# Patient Record
Sex: MALE | Employment: FULL TIME | URBAN - METROPOLITAN AREA
[De-identification: names, ages, dates, MRNs, and addresses within clinical notes are randomized per-mention and may not be internally consistent; named-entity substitution may affect disease eponyms.]

---

## 2017-09-18 ENCOUNTER — OFFICE VISIT (OUTPATIENT)
Dept: OPTOMETRY | Facility: CLINIC | Age: 32
End: 2017-09-18
Payer: COMMERCIAL

## 2017-09-18 DIAGNOSIS — H52.203 MYOPIA OF BOTH EYES WITH ASTIGMATISM: Primary | ICD-10-CM

## 2017-09-18 DIAGNOSIS — H52.13 MYOPIA OF BOTH EYES WITH ASTIGMATISM: Primary | ICD-10-CM

## 2017-09-18 PROCEDURE — 92015 DETERMINE REFRACTIVE STATE: CPT | Performed by: OPTOMETRIST

## 2017-09-18 PROCEDURE — 92004 COMPRE OPH EXAM NEW PT 1/>: CPT | Performed by: OPTOMETRIST

## 2017-09-18 ASSESSMENT — VISUAL ACUITY
OD_CC: 20/20-1
OS_CC+: -1
OS_CC: 20/20
OD_CC+: -1
OS_SC: 20/200
METHOD: SNELLEN - LINEAR
OD_CC: 20/20
CORRECTION_TYPE: GLASSES
OD_SC: 20/150
OS_CC: 20/20

## 2017-09-18 ASSESSMENT — REFRACTION_MANIFEST
METHOD_AUTOREFRACTION: 1
OD_AXIS: 087
OS_CYLINDER: +1.25
OD_AXIS: 095
OS_AXIS: 076
OD_CYLINDER: +3.75
OS_CYLINDER: +2.50
OS_AXIS: 070
OD_SPHERE: -7.25
OD_SPHERE: -3.50
OS_SPHERE: -4.75
OS_SPHERE: -5.75
OD_CYLINDER: +0.75

## 2017-09-18 ASSESSMENT — SLIT LAMP EXAM - LIDS
COMMENTS: NORMAL
COMMENTS: NORMAL

## 2017-09-18 ASSESSMENT — REFRACTION_WEARINGRX
OS_AXIS: 080
OS_CYLINDER: +0.75
SPECS_TYPE: SVL
OD_SPHERE: -4.25
OD_CYLINDER: +0.50
OD_AXIS: 092
OS_SPHERE: -4.50

## 2017-09-18 ASSESSMENT — CUP TO DISC RATIO
OS_RATIO: 0.25
OD_RATIO: 0.25

## 2017-09-18 ASSESSMENT — EXTERNAL EXAM - LEFT EYE: OS_EXAM: NORMAL

## 2017-09-18 ASSESSMENT — TONOMETRY
IOP_METHOD: APPLANATION
OD_IOP_MMHG: 19
OS_IOP_MMHG: 19

## 2017-09-18 ASSESSMENT — EXTERNAL EXAM - RIGHT EYE: OD_EXAM: NORMAL

## 2017-09-18 NOTE — PATIENT INSTRUCTIONS
Mild Arcus  Slightly over minused R eye  , slight change in astigmatism  L eye  Good ocular health

## 2017-09-18 NOTE — PROGRESS NOTES
Chief Complaint   Patient presents with     COMPREHENSIVE EYE EXAM       slight change only .25 D at last visit  [facility]  Has worn contact lenses in the past prefers glasses     Last Eye Exam: 11/18/2015  Dilated Previously: Yes    What are you currently using to see?  glasses       Distance Vision Acuity: Satisfied with vision    Near Vision Acuity: Satisfied with vision while reading  with glasses    Eye Comfort: good  Do you use eye drops? 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       OBJECTIVE: See Ophthalmology exam    ASSESSMENT:    ICD-10-CM    1. Myopia of both eyes with astigmatism - Both Eyes H52.13 REFRACTION    H52.203 EYE EXAM (SIMPLE-NONBILLABLE)    over minused OD, slight astigmatism  Increase OS   mild arcus   PLAN:   Update prescription   Lipid panel  Return to clinic 2 y      Korina Rothman OD

## 2017-09-18 NOTE — MR AVS SNAPSHOT
After Visit Summary   9/18/2017    Moody Matson    MRN: [id number]           Patient Information     Date Of Birth          1985        Visit Information        Provider Department      9/18/2017 2:00 PM [facility]        Today's Diagnoses     Myopia of both eyes with astigmatism - Both Eyes    -  1      Care Instructions    Mild Arcus  Slightly over minused R eye  , slight change in astigmatism  L eye  Good ocular health          Follow-ups after your visit        Follow-up notes from your care team     Return in about 1 year (around 9/18/2018).      Who to contact     If you have questions or need follow up information about today's clinic visit or your schedule please contact [facility] directly at [phone].  Normal or non-critical lab and imaging results will be communicated to you by MyChart, letter or phone within 4 business days after the clinic has received the results. If you do not hear from us within 7 days, please contact the clinic through MyChart or phone. If you have a critical or abnormal lab result, we will notify you by phone as soon as possible.  Submit refill requests through [employer] or call your pharmacy and they will forward the refill request to us. Please allow 3 business days for your refill to be completed.          Additional Information About Your Visit        MyChart Information     [employer] gives you secure access to your electronic health record. If you see a primary care provider, you can also send messages to your care team and make appointments. If you have questions, please call your primary care clinic.  If you do not have a primary care provider, please call [phone] and they will assist you.        Care EveryWhere ID     This is your Care EveryWhere ID. This could be used by other organizations to access your [place] medical records  [id number]         Blood Pressure from Last 3 Encounters:    No data found for BP    Weight from Last 3 Encounters:   No data found for Wt              We Performed the Following     EYE EXAM (SIMPLE-NONBILLABLE)     REFRACTION        Primary Care Provider    None Specified       No primary provider on file.        Equal Access to Services     ANISH ADRIAN : Hadii aad ku hadsjkelvin Mayraed, isreal talonpat, paige jose, angie ganayo frankcharlene parekh kathie linda. So [facility] [phone].    ATENCIÓN: Si habla español, tiene a edwards disposición servicios gratuitos de asistencia lingüística. Llame al [phone].    We comply with applicable federal civil rights laws and [place] laws. We do not discriminate on the basis of race, color, national origin, age, disability sex, sexual orientation or gender identity.            Thank you!     Thank you for choosing [facility]  for your care. Our goal is always to provide you with excellent care. Hearing back from our patients is one way we can continue to improve our services. Please take a few minutes to complete the written survey that you may receive in the mail after your visit with us. Thank you!             Your Updated Medication List - Protect others around you: Learn how to safely use, store and throw away your medicines at www.disposemymeds.org.      Notice  As of 9/18/2017  3:12 PM    You have not been prescribed any medications.

## 2019-02-11 ENCOUNTER — OFFICE VISIT (OUTPATIENT)
Dept: OPTOMETRY | Facility: CLINIC | Age: 34
End: 2019-02-11
Payer: COMMERCIAL

## 2019-02-11 DIAGNOSIS — H52.13 MYOPIA OF BOTH EYES WITH ASTIGMATISM: Primary | ICD-10-CM

## 2019-02-11 DIAGNOSIS — H52.203 MYOPIA OF BOTH EYES WITH ASTIGMATISM: Primary | ICD-10-CM

## 2019-02-11 PROCEDURE — 92014 COMPRE OPH EXAM EST PT 1/>: CPT | Performed by: OPTOMETRIST

## 2019-02-11 PROCEDURE — 92015 DETERMINE REFRACTIVE STATE: CPT | Performed by: OPTOMETRIST

## 2019-02-11 ASSESSMENT — CONF VISUAL FIELD
OS_NORMAL: 1
METHOD: COUNTING FINGERS
OD_NORMAL: 1

## 2019-02-11 ASSESSMENT — REFRACTION_MANIFEST
OS_AXIS: 069
OS_AXIS: 065
OD_CYLINDER: +2.00
OD_AXIS: 080
OD_SPHERE: -5.50
OS_SPHERE: -5.25
OS_CYLINDER: +2.00
OD_SPHERE: -3.50
OS_SPHERE: -4.25
OD_AXIS: 079
OD_CYLINDER: +0.50
METHOD_AUTOREFRACTION: 1
OS_CYLINDER: +1.25

## 2019-02-11 ASSESSMENT — VISUAL ACUITY
CORRECTION_TYPE: GLASSES
OD_CC: 20/30
OS_SC: 20/400
OS_CC: 20/20
METHOD_MR: PRA 1.50
OD_SC: 20/400
OD_CC: 20/25
OS_CC: 20/20
METHOD: SNELLEN - LINEAR

## 2019-02-11 ASSESSMENT — REFRACTION_WEARINGRX
OS_SPHERE: -4.75
SPECS_TYPE: SVL
OD_CYLINDER: +0.75
OD_AXIS: 095
OS_AXIS: 070
OS_CYLINDER: +1.25
OD_SPHERE: -3.50

## 2019-02-11 ASSESSMENT — REFRACTION
OS_SPHERE: -4.25
OD_AXIS: 080
OD_SPHERE: -3.50
OS_AXIS: 065
OS_CYLINDER: +1.25
OD_CYLINDER: +0.50

## 2019-02-11 ASSESSMENT — TONOMETRY
OD_IOP_MMHG: 14
OS_IOP_MMHG: 14
IOP_METHOD: APPLANATION

## 2019-02-11 ASSESSMENT — CUP TO DISC RATIO
OS_RATIO: 0.25
OD_RATIO: 0.25

## 2019-02-11 ASSESSMENT — SLIT LAMP EXAM - LIDS
COMMENTS: NORMAL
COMMENTS: NORMAL

## 2019-02-11 ASSESSMENT — EXTERNAL EXAM - LEFT EYE: OS_EXAM: NORMAL

## 2019-02-11 ASSESSMENT — EXTERNAL EXAM - RIGHT EYE: OD_EXAM: NORMAL

## 2019-02-11 NOTE — PROGRESS NOTES
Chief Complaint   Patient presents with     Annual Eye Exam      Interested in lasik with Dr Ying at [facility]   Currently has a mild URI    Last Eye Exam: 2017 here with Dr. Rothman  Dilated Previously: Yes    What are you currently using to see?  glasses       Distance Vision Acuity: Satisfied with vision    Near Vision Acuity: Satisfied with vision while reading  with glasses    Eye Comfort: good  Do you use eye drops? : No  Occupation or Hobbies: Hospitalist    Debbie Varela, Optometric Assistant      Final Rx      Sphere Cylinder Axis   Right -3.50 +0.75 095   Left -4.75 +1.25 070   Type: SVL   Expiration Date: 9/19/2019         Medical, surgical and family histories reviewed and updated 2/11/2019.       OBJECTIVE: See Ophthalmology exam    ASSESSMENT:    ICD-10-CM    1. Myopia of both eyes with astigmatism H52.13     H52.203       PLAN:   Consult for lasik with Dr. Ying  Will fax records         Korina Rothman OD

## 2019-02-11 NOTE — LETTER
2/11/2019         RE: Moody Matson  [address] [address]        Dear Colleague,    Thank you for referring your patient, Moody Matson, to the [facility]. Please see a copy of my visit note below.    Chief Complaint   Patient presents with     Annual Eye Exam      Interested in lasik with Dr Ying at [facility]   Currently has a mild URI    Last Eye Exam: 2017 here with Dr. Rothman  Dilated Previously: Yes    What are you currently using to see?  glasses       Distance Vision Acuity: Satisfied with vision    Near Vision Acuity: Satisfied with vision while reading  with glasses    Eye Comfort: good  Do you use eye drops? : No  Occupation or Hobbies: Hospitalist    Debbie Varela, Optometric Assistant      Final Rx      Sphere Cylinder Axis   Right -3.50 +0.75 095   Left -4.75 +1.25 070   Type: SVL   Expiration Date: 9/19/2019         Medical, surgical and family histories reviewed and updated 2/11/2019.       OBJECTIVE: See Ophthalmology exam    ASSESSMENT:    ICD-10-CM    1. Myopia of both eyes with astigmatism H52.13     H52.203       PLAN:   Consult for lasik with Dr. Ying  Will fax records         Korina Rothman OD     Again, thank you for allowing me to participate in the care of your patient.        Sincerely,        Korina Rothman, OD

## 2020-03-02 ENCOUNTER — HEALTH MAINTENANCE LETTER (OUTPATIENT)
Age: 35
End: 2020-03-02

## 2020-08-18 ENCOUNTER — ANCILLARY PROCEDURE (OUTPATIENT)
Dept: GENERAL RADIOLOGY | Facility: CLINIC | Age: 35
End: 2020-08-18
Attending: PREVENTIVE MEDICINE
Payer: COMMERCIAL

## 2020-08-18 ENCOUNTER — OFFICE VISIT (OUTPATIENT)
Dept: URGENT CARE | Facility: URGENT CARE | Age: 35
End: 2020-08-18
Payer: COMMERCIAL

## 2020-08-18 VITALS
OXYGEN SATURATION: 98 % | WEIGHT: 287.5 LBS | HEART RATE: 92 BPM | SYSTOLIC BLOOD PRESSURE: 125 MMHG | TEMPERATURE: 97.3 F | DIASTOLIC BLOOD PRESSURE: 80 MMHG

## 2020-08-18 DIAGNOSIS — M25.532 LEFT WRIST PAIN: ICD-10-CM

## 2020-08-18 DIAGNOSIS — M25.532 LEFT WRIST PAIN: Primary | ICD-10-CM

## 2020-08-18 PROCEDURE — 73110 X-RAY EXAM OF WRIST: CPT | Mod: LT

## 2020-08-18 PROCEDURE — 90471 IMMUNIZATION ADMIN: CPT | Performed by: PREVENTIVE MEDICINE

## 2020-08-18 PROCEDURE — 99203 OFFICE O/P NEW LOW 30 MIN: CPT | Mod: 25 | Performed by: PREVENTIVE MEDICINE

## 2020-08-18 PROCEDURE — 73090 X-RAY EXAM OF FOREARM: CPT | Mod: LT

## 2020-08-18 PROCEDURE — 90714 TD VACC NO PRESV 7 YRS+ IM: CPT | Performed by: PREVENTIVE MEDICINE

## 2020-08-19 NOTE — PATIENT INSTRUCTIONS
Left arm nondisplaced distal radius fracture  Abrasion left hand    PLAN:  Rest, Ice, Compress, Elevate  Tylenol, ibuprofen  Td updated tonight  Mupirocin to abrasion  Advise nonstick dressing and coban until granulation occurs  Left arm splint, follow up with ortho for definitive care in next 1-2 days.

## 2020-08-19 NOTE — PROGRESS NOTES
SUBJECTIVE:  Chief Complaint   Patient presents with     BIKING ACCIDENT     FELL 2 - 3 HOURS AGO PT DENIES HITTING HIS HEAD JUST WANT TO MAKE SURE NOTHING IS BROKEN     Moody Matson is a 35 year old male presents with a chief complaint of left wrist pain.  The injury occurred 2 hour(s) ago.   The injury happened while playing. How: fall immediate pain.  The patient complained of moderate pain  and has had decreased ROM.  Pain exacerbated by movement.  Relieved by rest.  He treated it initially with Ibuprofen. This is the first time this type of injury has occurred to this patient.     PMH - Obesity    Social History     Tobacco Use     Smoking status: Never Smoker     Smokeless tobacco: Never Used   Substance Use Topics     Alcohol use: Not on file   works as a hospitalist  Occasional alcohol  No drug use    FH - no osteoporosis    ROS:  Review of systems negative except as stated above.    EXAM:   BP (!) 129/90   Pulse 92   Temp 97.3  F (36.3  C)   Wt 130.4 kg (287 lb 8 oz)   SpO2 98%   Gen: healthy,alert,no distress  Extremity: wrist has swelling, point tenderness over volar and dorsal wrist, decreased ROM due to pain (both active and passive) and pain with any movement.   There is not compromise to the distal circulation.  Pulses are +2 and CRT is brisk  GENERAL APPEARANCE: healthy, alert and no distress  EXTREMITIES: peripheral pulses normal  SKIN: no suspicious lesions or rashes except abrasion of left hand  NEURO: Normal strength and tone, sensory exam grossly normal, mentation intact and speech normal  Nl distal cms in left arm    X-RAY was done of left wrist and forearm - left distal radius nondisplaced fracture    ASSESSMENT:   Left arm nondisplaced distal radius fracture  Abrasion left hand    PLAN:  Rest, Ice, Compress, Elevate  Tylenol, ibuprofen  Td updated tonight  Mupirocin to abrasion  Advise nonstick dressing and coban until granulation occurs  Left arm splint, follow up with ortho for  definitive care in next 1-2 days.

## 2020-12-14 ENCOUNTER — HEALTH MAINTENANCE LETTER (OUTPATIENT)
Age: 35
End: 2020-12-14

## 2020-12-27 ENCOUNTER — RESULTS ONLY (OUTPATIENT)
Dept: LAB | Age: 35
End: 2020-12-27

## 2020-12-27 ENCOUNTER — OFFICE VISIT (OUTPATIENT)
Dept: URGENT CARE | Facility: URGENT CARE | Age: 35
End: 2020-12-27
Payer: COMMERCIAL

## 2020-12-27 DIAGNOSIS — Z53.9 ERRONEOUS ENCOUNTER--DISREGARD: Primary | ICD-10-CM

## 2020-12-27 LAB
LABORATORY COMMENT REPORT: NORMAL
SARS-COV-2 RNA SPEC QL NAA+PROBE: NEGATIVE
SARS-COV-2 RNA SPEC QL NAA+PROBE: NORMAL
SPECIMEN SOURCE: NORMAL
SPECIMEN SOURCE: NORMAL

## 2021-04-18 ENCOUNTER — HEALTH MAINTENANCE LETTER (OUTPATIENT)
Age: 36
End: 2021-04-18

## 2021-10-02 ENCOUNTER — HEALTH MAINTENANCE LETTER (OUTPATIENT)
Age: 36
End: 2021-10-02

## 2022-05-14 ENCOUNTER — HEALTH MAINTENANCE LETTER (OUTPATIENT)
Age: 37
End: 2022-05-14

## 2022-09-03 ENCOUNTER — HEALTH MAINTENANCE LETTER (OUTPATIENT)
Age: 37
End: 2022-09-03

## 2023-06-03 ENCOUNTER — HEALTH MAINTENANCE LETTER (OUTPATIENT)
Age: 38
End: 2023-06-03

## 2024-08-20 ENCOUNTER — IMAGING SERVICES (OUTPATIENT)
Dept: GENERAL RADIOLOGY | Age: 39
End: 2024-08-20
Attending: PREVENTIVE MEDICINE

## 2024-08-20 DIAGNOSIS — Z11.1 SCREENING EXAMINATION FOR PULMONARY TUBERCULOSIS: Primary | ICD-10-CM

## 2024-08-20 DIAGNOSIS — Z11.1 SCREENING EXAMINATION FOR PULMONARY TUBERCULOSIS: ICD-10-CM
